# Patient Record
Sex: FEMALE | Race: BLACK OR AFRICAN AMERICAN | Employment: FULL TIME | URBAN - METROPOLITAN AREA
[De-identification: names, ages, dates, MRNs, and addresses within clinical notes are randomized per-mention and may not be internally consistent; named-entity substitution may affect disease eponyms.]

---

## 2022-05-27 ENCOUNTER — OFFICE VISIT (OUTPATIENT)
Dept: ENDOCRINOLOGY | Age: 57
End: 2022-05-27
Payer: COMMERCIAL

## 2022-05-27 VITALS
WEIGHT: 147.7 LBS | DIASTOLIC BLOOD PRESSURE: 82 MMHG | SYSTOLIC BLOOD PRESSURE: 148 MMHG | HEART RATE: 65 BPM | TEMPERATURE: 97.8 F | HEIGHT: 67 IN | OXYGEN SATURATION: 99 % | BODY MASS INDEX: 23.18 KG/M2

## 2022-05-27 DIAGNOSIS — E83.52 HYPERCALCEMIA: Primary | ICD-10-CM

## 2022-05-27 DIAGNOSIS — I10 BENIGN ESSENTIAL HTN: ICD-10-CM

## 2022-05-27 PROBLEM — E78.2 MIXED HYPERLIPIDEMIA: Status: ACTIVE | Noted: 2022-05-27

## 2022-05-27 PROCEDURE — 99204 OFFICE O/P NEW MOD 45 MIN: CPT | Performed by: INTERNAL MEDICINE

## 2022-05-27 RX ORDER — AMLODIPINE BESYLATE 5 MG/1
5 TABLET ORAL DAILY
COMMUNITY

## 2022-05-27 RX ORDER — ATORVASTATIN CALCIUM 20 MG/1
20 TABLET, FILM COATED ORAL DAILY
COMMUNITY

## 2022-05-27 NOTE — LETTER
5/27/2022    Patient: Abiola Cough   YOB: 1965   Date of Visit: 5/27/2022     Socorro Lopez NP  [facility] [address] [place] [address]  [phone]  Via Fax: [phone]    Dear Socorro Lopez NP,      Thank you for referring Ms. Monique Ring to [address] [place] [address] for evaluation. My notes for this consultation are attached. If you have questions, please do not hesitate to call me. I look forward to following your patient along with you.       Sincerely,    Robert Temple MD

## 2022-05-27 NOTE — PROGRESS NOTES
History and Physical    Patient: Vinh Carmona MRN: [id number]  SSN: [id number]    YOB: 1965  Age: 56 y.o. Sex: female      Subjective:      Vinh Carmona is a 56 y.o. female past medical history of hypertension, hyperlipidemia is sent to me by primary care provider Alex Tamez NP hypercalcemia. Onset: Incidentally noted during labs from February 2022, labs repeated in March 2022 and calcium was still elevated    Symptoms of hypercalcemia: Some polyuria, denies kidney stones, bone fractures, stomach pain, nausea. Energy level is good. Lithium/ HCTZ: Never    Calcium supplement: Stop taking calcium tablets after calcium was found to be elevated, consumes maybe 1 serving of dairy in a day    Vit D supplement: No    Osteoporosis/fragility fracture: No    Dental issues: No    Family history of hypercalcemia/hyperparathyroidism/osteoporosis: None to her knowledge    Past Medical History:   Diagnosis Date    Hypercalcemia     Hyperlipidemia     Hypertension      Past Surgical History:   Procedure Laterality Date    HX WISDOM TEETH EXTRACTION        Family History   Problem Relation Age of Onset    Heart Disease Mother     Hypertension Mother      Social History     Tobacco Use    Smoking status: Never Smoker    Smokeless tobacco: Never Used   Substance Use Topics    Alcohol use: Never      Prior to Admission medications    Medication Sig Start Date End Date Taking? Authorizing Provider   atorvastatin (LIPITOR) 20 mg tablet Take 20 mg by mouth daily. Yes Provider, Historical   amLODIPine (NORVASC) 5 mg tablet Take 5 mg by mouth daily.    Yes Provider, Historical        No Known Allergies    Review of Systems:  ROS    A comprehensive review of systems was preformed and it is negative except mentioned in HPI    Objective:     Vitals:    05/27/22 0920 05/27/22 0927   BP: (!) 149/87 (!) 148/82   Pulse: 63 65   Temp: 97.8 °F (36.6 °C)    TempSrc: Temporal    SpO2: 99% Weight: 147 lb 11.2 oz (67 kg)    Height: 5' 7\" (1.702 m)         Physical Exam:    Physical Exam  Vitals and nursing note reviewed. Constitutional:       Appearance: Normal appearance. HENT:      Head: Normocephalic and atraumatic. Cardiovascular:      Rate and Rhythm: Normal rate and regular rhythm. Pulmonary:      Effort: Pulmonary effort is normal.      Breath sounds: Normal breath sounds. Musculoskeletal:      Cervical back: Neck supple. Neurological:      General: No focal deficit present. Mental Status: She is alert and oriented to person, place, and time. Psychiatric:         Mood and Affect: Mood normal.         Behavior: Behavior normal.          Labs and Imaging:    Last 3 Recorded Weights in this Encounter    05/27/22 0920   Weight: 147 lb 11.2 oz (67 kg)        No results found for: HBA1C, ZAL9PHDK, ZAD5RSYE, EWS9MRSP     Assessment:     Patient Active Problem List   Diagnosis Code    Hypercalcemia E83.52    Benign essential HTN I10    Mixed hyperlipidemia E78.2           Plan:     Hypercalcemia:  I reviewed labs and notes from the referring provider's office. 2-3-2022:  Calcium borderline elevated at 10.8 (8.7-10.2)  Normal renal function  Alkaline phosphatase normal  Normal TSH    3-4-2022:  Calcium normal at 10.9  Normal hemoglobin and hematocrit  PTH normal at 42 (15-65)    Clinically she does not have any symptoms of hypercalcemia. Not on any offending medications. Plan:  I will do a complete biochemical evaluation and see her back in a few weeks to discuss further management. Okay to consume 2 servings of dairy for bone health. Avoid calcium tablets, Tums, dehydration. Essential hypertension:  Currently on amlodipine 5 mg daily. Blood pressure is borderline elevated. I will reevaluate at next visit.     Orders Placed This Encounter    METABOLIC PANEL, COMPREHENSIVE     Standing Status:   Future     Standing Expiration Date:   8/29/2022    PTH INTACT Standing Status:   Future     Standing Expiration Date:   8/29/2022    PHOSPHORUS     Standing Status:   Future     Standing Expiration Date:   8/29/2022    MAGNESIUM     Standing Status:   Future     Standing Expiration Date:   8/29/2022    CALCIUM, UR, 24 HR     Standing Status:   Future     Standing Expiration Date:   8/29/2022    CREATININE, UR, 24 HR     Standing Status:   Future     Standing Expiration Date:   8/29/2022     Order Specific Question:   Has the patient fasted?      Answer:   No     Order Specific Question:   Patient Height     Answer:   5     Order Specific Question:   Patient Weight     Answer:   [address] PEPTIDE     Standing Status:   Future     Standing Expiration Date:   8/29/2022    PROTEIN ELECTROPHORESIS     Standing Status:   Future     Standing Expiration Date:   8/29/2022    VITAMIN D, 25 HYDROXY     Standing Status:   Future     Standing Expiration Date:   8/29/2022    VITAMIN D, 1, 25 DIHYDROXY     Standing Status:   Future     Standing Expiration Date:   8/29/2022        Signed By: Rachael Baldwin MD     May 27, 2022      Return to clinic 3 weeks

## 2022-06-10 ENCOUNTER — OFFICE VISIT (OUTPATIENT)
Dept: ENDOCRINOLOGY | Age: 57
End: 2022-06-10
Payer: COMMERCIAL

## 2022-06-10 VITALS
SYSTOLIC BLOOD PRESSURE: 138 MMHG | DIASTOLIC BLOOD PRESSURE: 82 MMHG | WEIGHT: 147.3 LBS | OXYGEN SATURATION: 99 % | HEART RATE: 66 BPM | HEIGHT: 67 IN | TEMPERATURE: 97.5 F | BODY MASS INDEX: 23.12 KG/M2

## 2022-06-10 DIAGNOSIS — E21.0 PRIMARY HYPERPARATHYROIDISM (HCC): Primary | ICD-10-CM

## 2022-06-10 DIAGNOSIS — I10 BENIGN ESSENTIAL HTN: ICD-10-CM

## 2022-06-10 LAB
1,25(OH)2D SERPL-MCNC: 61.8 PG/ML (ref 19.9–79.3)
25(OH)D3+25(OH)D2 SERPL-MCNC: 39 NG/ML (ref 30–100)
ALBUMIN SERPL ELPH-MCNC: 3.9 G/DL (ref 2.9–4.4)
ALBUMIN SERPL-MCNC: 4.8 G/DL (ref 3.8–4.9)
ALBUMIN/GLOB SERPL: 1 {RATIO} (ref 0.7–1.7)
ALBUMIN/GLOB SERPL: 1.7 {RATIO} (ref 1.2–2.2)
ALP SERPL-CCNC: 95 IU/L (ref 44–121)
ALPHA1 GLOB SERPL ELPH-MCNC: 0.2 G/DL (ref 0–0.4)
ALPHA2 GLOB SERPL ELPH-MCNC: 0.7 G/DL (ref 0.4–1)
ALT SERPL-CCNC: 18 IU/L (ref 0–32)
AST SERPL-CCNC: 15 IU/L (ref 0–40)
B-GLOBULIN SERPL ELPH-MCNC: 1.2 G/DL (ref 0.7–1.3)
BILIRUB SERPL-MCNC: 0.4 MG/DL (ref 0–1.2)
BUN SERPL-MCNC: 9 MG/DL (ref 6–24)
BUN/CREAT SERPL: 11 (ref 9–23)
CALCIUM 24H UR-MCNC: 7.9 MG/DL
CALCIUM 24H UR-MRATE: 205 MG/24 HR (ref 0–320)
CALCIUM SERPL-MCNC: 10.4 MG/DL (ref 8.7–10.2)
CHLORIDE SERPL-SCNC: 103 MMOL/L (ref 96–106)
CO2 SERPL-SCNC: 24 MMOL/L (ref 20–29)
CREAT 24H UR-MRATE: 1318 MG/24 HR (ref 800–1800)
CREAT SERPL-MCNC: 0.81 MG/DL (ref 0.57–1)
CREAT UR-MCNC: 50.7 MG/DL
EGFR: 86 ML/MIN/1.73
GAMMA GLOB SERPL ELPH-MCNC: 1.7 G/DL (ref 0.4–1.8)
GLOBULIN SER CALC-MCNC: 2.9 G/DL (ref 1.5–4.5)
GLOBULIN SER CALC-MCNC: 3.8 G/DL (ref 2.2–3.9)
GLUCOSE SERPL-MCNC: 78 MG/DL (ref 65–99)
M PROTEIN SERPL ELPH-MCNC: NORMAL G/DL
MAGNESIUM SERPL-MCNC: 2.3 MG/DL (ref 1.6–2.3)
PHOSPHATE SERPL-MCNC: 3.5 MG/DL (ref 3–4.3)
PLEASE NOTE, 011150: NORMAL
POTASSIUM SERPL-SCNC: 4.6 MMOL/L (ref 3.5–5.2)
PROT SERPL-MCNC: 7.7 G/DL (ref 6–8.5)
PTH RELATED PROT SERPL-SCNC: <2 PMOL/L
PTH-INTACT SERPL-MCNC: 62 PG/ML (ref 15–65)
SODIUM SERPL-SCNC: 140 MMOL/L (ref 134–144)

## 2022-06-10 PROCEDURE — 99214 OFFICE O/P EST MOD 30 MIN: CPT | Performed by: INTERNAL MEDICINE

## 2022-06-10 NOTE — LETTER
6/10/2022    Patient: Joseph Car   YOB: 1965   Date of Visit: 6/10/2022     Maryan De La Cruz NP  [facility] [address] [address]  Via Fax: [phone]    Dear Maryan De La Cruz NP,      Thank you for referring Ms. Monique [place] to [address] [place] [address] for evaluation. My notes for this consultation are attached. If you have questions, please do not hesitate to call me. I look forward to following your patient along with you.       Sincerely,    Patti Coronado MD

## 2022-06-10 NOTE — PROGRESS NOTES
History and Physical    Patient: Roseann [place] MRN: [id number]  SSN: [id number]    YOB: 1965  Age: 56 y.o. Sex: female      Subjective:      Roseann [place] is a 56 y.o. female past medical history of hypertension, hyperlipidemia is here for follow-up of hypercalcemia. She was sent to me by primary care provider Neri Raphael NP. After the initial visit patient had detailed biochemical evaluation including 24-hour urine studies done. She is here to follow-up on the results. Denies any change in her symptoms since the initial visit. She is taking K2 with some vitamin D3 over-the-counter. Does not take any calcium tablets or Tums. Initial history: Onset: Incidentally noted during labs from February 2022, labs repeated in March 2022 and calcium was still elevated    Symptoms of hypercalcemia: Some polyuria, denies kidney stones, bone fractures, stomach pain, nausea. Energy level is good. Lithium/ HCTZ: Never    Calcium supplement: Stop taking calcium tablets after calcium was found to be elevated, consumes maybe 1 serving of dairy in a day    Vit D supplement: No    Osteoporosis/fragility fracture: No    Dental issues: No    Family history of hypercalcemia/hyperparathyroidism/osteoporosis: None to her knowledge    Past Medical History:   Diagnosis Date    Hypercalcemia     Hyperlipidemia     Hypertension      Past Surgical History:   Procedure Laterality Date    HX WISDOM TEETH EXTRACTION        Family History   Problem Relation Age of Onset    Heart Disease Mother     Hypertension Mother      Social History     Tobacco Use    Smoking status: Never Smoker    Smokeless tobacco: Never Used   Substance Use Topics    Alcohol use: Never      Prior to Admission medications    Medication Sig Start Date End Date Taking? Authorizing Provider   atorvastatin (LIPITOR) 20 mg tablet Take 20 mg by mouth daily.    Yes Provider, Historical   amLODIPine (NORVASC) 5 mg tablet Take 5 mg by mouth daily. Yes Provider, Historical        No Known Allergies    Review of Systems:  ROS    A comprehensive review of systems was preformed and it is negative except mentioned in HPI    Objective:     Vitals:    06/10/22 1437 06/10/22 1444   BP: (!) 146/84 138/82   Pulse: 66    Temp: 97.5 °F (36.4 °C)    TempSrc: Temporal    SpO2: 99%    Weight: 147 lb 4.8 oz (66.8 kg)    Height: 5' 7\" (1.702 m)         Physical Exam:    Physical Exam  Vitals and nursing note reviewed. Constitutional:       Appearance: Normal appearance. HENT:      Head: Normocephalic and atraumatic. Cardiovascular:      Rate and Rhythm: Normal rate and regular rhythm. Pulmonary:      Effort: Pulmonary effort is normal.      Breath sounds: Normal breath sounds. Neurological:      Mental Status: She is alert. Labs and Imaging:    Last 3 Recorded Weights in this Encounter    06/10/22 1437   Weight: 147 lb 4.8 oz (66.8 kg)        No results found for: HBA1C, FIB2QNKH, ULW5AEDI, NZZ6FEFK     Assessment:     Patient Active Problem List   Diagnosis Code    Hypercalcemia E83.52    Benign essential HTN I10    Mixed hyperlipidemia E78.2           Plan:     Hypercalcemia:  2-3-2022:  Calcium borderline elevated at 10.8 (8.7-10.2)  Normal renal function  Alkaline phosphatase normal  Normal TSH    3-4-2022:  Calcium normal at 10.9  Normal hemoglobin and hematocrit  PTH normal at 42 (15-65)    Clinically she does not have any symptoms of hypercalcemia. Not on any offending medications. 6-2-2022:  Calcium slightly elevated at 10.4  Phosphorus normal at 3.5  Magnesium normal at 2.3  Normal alkaline phosphatase  PTH upper end of normal range at 62  24-hour urine calcium normal at 205  25 hydroxy vitamin D normal at 39  1, 25 dihydroxy vitamin D normal at 61.8  Serum protein electrophoresis  Plan: This is possibly mild primary hyperparathyroidism. I will do a screening bone density scan.   So far she does not meet criteria for surgery because calcium elevation is mild and she is asymptomatic. If bone density scan is looking normal, continue to monitor with labs every 6 months. Okay to consume 2 servings of dairy for bone health. Avoid calcium tablets, Tums, dehydration. Explained to patient that if she develops kidney stones, if kidney function starts to decline, calcium elevation worsens or if bone density is low, we may need to talk about surgery. Essential hypertension:  Blood pressure well controlled on current medications. Continue the same.     Orders Placed This Encounter    DEXA BONE DENSITY STUDY AXIAL     Standing Status:   Future     Standing Expiration Date:   7/10/2023     Order Specific Question:   Reason for Exam     Answer:   screening DEXA        Signed By: Michael Gutiérrez MD     Alana 10, 2022      Return to clinic

## 2022-06-20 DIAGNOSIS — E83.52 HYPERCALCEMIA: ICD-10-CM

## 2023-05-25 RX ORDER — ATORVASTATIN CALCIUM 20 MG/1
20 TABLET, FILM COATED ORAL DAILY
COMMUNITY

## 2023-05-25 RX ORDER — AMLODIPINE BESYLATE 5 MG/1
5 TABLET ORAL DAILY
COMMUNITY